# Patient Record
Sex: MALE | Race: WHITE | HISPANIC OR LATINO | URBAN - METROPOLITAN AREA
[De-identification: names, ages, dates, MRNs, and addresses within clinical notes are randomized per-mention and may not be internally consistent; named-entity substitution may affect disease eponyms.]

---

## 2020-06-07 ENCOUNTER — EMERGENCY (EMERGENCY)
Facility: HOSPITAL | Age: 25
LOS: 1 days | Discharge: OTHER INSTITUTION | End: 2020-06-07
Attending: STUDENT IN AN ORGANIZED HEALTH CARE EDUCATION/TRAINING PROGRAM
Payer: MEDICARE

## 2020-06-07 VITALS
HEIGHT: 68 IN | RESPIRATION RATE: 20 BRPM | OXYGEN SATURATION: 98 % | SYSTOLIC BLOOD PRESSURE: 165 MMHG | HEART RATE: 108 BPM | WEIGHT: 199.96 LBS | DIASTOLIC BLOOD PRESSURE: 89 MMHG | TEMPERATURE: 98 F

## 2020-06-07 DIAGNOSIS — F20.9 SCHIZOPHRENIA, UNSPECIFIED: ICD-10-CM

## 2020-06-07 DIAGNOSIS — F48.9 NONPSYCHOTIC MENTAL DISORDER, UNSPECIFIED: ICD-10-CM

## 2020-06-07 DIAGNOSIS — F33.3 MAJOR DEPRESSIVE DISORDER, RECURRENT, SEVERE WITH PSYCHOTIC SYMPTOMS: ICD-10-CM

## 2020-06-07 LAB
ALBUMIN SERPL ELPH-MCNC: 4.8 G/DL — SIGNIFICANT CHANGE UP (ref 3.3–5.2)
ALP SERPL-CCNC: 70 U/L — SIGNIFICANT CHANGE UP (ref 40–120)
ALT FLD-CCNC: 39 U/L — SIGNIFICANT CHANGE UP
AMPHET UR-MCNC: NEGATIVE — SIGNIFICANT CHANGE UP
ANION GAP SERPL CALC-SCNC: 16 MMOL/L — SIGNIFICANT CHANGE UP (ref 5–17)
APAP SERPL-MCNC: <7.5 UG/ML — LOW (ref 10–26)
APPEARANCE UR: ABNORMAL
AST SERPL-CCNC: 25 U/L — SIGNIFICANT CHANGE UP
BACTERIA # UR AUTO: ABNORMAL
BARBITURATES UR SCN-MCNC: NEGATIVE — SIGNIFICANT CHANGE UP
BASOPHILS # BLD AUTO: 0.06 K/UL — SIGNIFICANT CHANGE UP (ref 0–0.2)
BASOPHILS NFR BLD AUTO: 0.5 % — SIGNIFICANT CHANGE UP (ref 0–2)
BENZODIAZ UR-MCNC: POSITIVE
BILIRUB SERPL-MCNC: 0.3 MG/DL — LOW (ref 0.4–2)
BILIRUB UR-MCNC: NEGATIVE — SIGNIFICANT CHANGE UP
BUN SERPL-MCNC: 9 MG/DL — SIGNIFICANT CHANGE UP (ref 8–20)
CALCIUM SERPL-MCNC: 9.8 MG/DL — SIGNIFICANT CHANGE UP (ref 8.6–10.2)
CHLORIDE SERPL-SCNC: 102 MMOL/L — SIGNIFICANT CHANGE UP (ref 98–107)
CO2 SERPL-SCNC: 23 MMOL/L — SIGNIFICANT CHANGE UP (ref 22–29)
COCAINE METAB.OTHER UR-MCNC: NEGATIVE — SIGNIFICANT CHANGE UP
COLOR SPEC: YELLOW — SIGNIFICANT CHANGE UP
CREAT SERPL-MCNC: 1.01 MG/DL — SIGNIFICANT CHANGE UP (ref 0.5–1.3)
DIFF PNL FLD: ABNORMAL
EOSINOPHIL # BLD AUTO: 0.02 K/UL — SIGNIFICANT CHANGE UP (ref 0–0.5)
EOSINOPHIL NFR BLD AUTO: 0.2 % — SIGNIFICANT CHANGE UP (ref 0–6)
EPI CELLS # UR: SIGNIFICANT CHANGE UP
ETHANOL SERPL-MCNC: <10 MG/DL — SIGNIFICANT CHANGE UP
GLUCOSE SERPL-MCNC: 103 MG/DL — HIGH (ref 70–99)
GLUCOSE UR QL: NEGATIVE MG/DL — SIGNIFICANT CHANGE UP
HCT VFR BLD CALC: 45.3 % — SIGNIFICANT CHANGE UP (ref 39–50)
HGB BLD-MCNC: 15.3 G/DL — SIGNIFICANT CHANGE UP (ref 13–17)
IMM GRANULOCYTES NFR BLD AUTO: 0.2 % — SIGNIFICANT CHANGE UP (ref 0–1.5)
KETONES UR-MCNC: NEGATIVE — SIGNIFICANT CHANGE UP
LEUKOCYTE ESTERASE UR-ACNC: NEGATIVE — SIGNIFICANT CHANGE UP
LYMPHOCYTES # BLD AUTO: 37.9 % — SIGNIFICANT CHANGE UP (ref 13–44)
LYMPHOCYTES # BLD AUTO: 4.97 K/UL — HIGH (ref 1–3.3)
MCHC RBC-ENTMCNC: 29.4 PG — SIGNIFICANT CHANGE UP (ref 27–34)
MCHC RBC-ENTMCNC: 33.8 GM/DL — SIGNIFICANT CHANGE UP (ref 32–36)
MCV RBC AUTO: 86.9 FL — SIGNIFICANT CHANGE UP (ref 80–100)
METHADONE UR-MCNC: NEGATIVE — SIGNIFICANT CHANGE UP
MONOCYTES # BLD AUTO: 1.46 K/UL — HIGH (ref 0–0.9)
MONOCYTES NFR BLD AUTO: 11.1 % — SIGNIFICANT CHANGE UP (ref 2–14)
NEUTROPHILS # BLD AUTO: 6.58 K/UL — SIGNIFICANT CHANGE UP (ref 1.8–7.4)
NEUTROPHILS NFR BLD AUTO: 50.1 % — SIGNIFICANT CHANGE UP (ref 43–77)
NITRITE UR-MCNC: NEGATIVE — SIGNIFICANT CHANGE UP
OPIATES UR-MCNC: NEGATIVE — SIGNIFICANT CHANGE UP
PCP SPEC-MCNC: SIGNIFICANT CHANGE UP
PCP UR-MCNC: NEGATIVE — SIGNIFICANT CHANGE UP
PH UR: 6 — SIGNIFICANT CHANGE UP (ref 5–8)
PLATELET # BLD AUTO: 211 K/UL — SIGNIFICANT CHANGE UP (ref 150–400)
POTASSIUM SERPL-MCNC: 4 MMOL/L — SIGNIFICANT CHANGE UP (ref 3.5–5.3)
POTASSIUM SERPL-SCNC: 4 MMOL/L — SIGNIFICANT CHANGE UP (ref 3.5–5.3)
PROT SERPL-MCNC: 7.7 G/DL — SIGNIFICANT CHANGE UP (ref 6.6–8.7)
PROT UR-MCNC: 15 MG/DL
RBC # BLD: 5.21 M/UL — SIGNIFICANT CHANGE UP (ref 4.2–5.8)
RBC # FLD: 13 % — SIGNIFICANT CHANGE UP (ref 10.3–14.5)
RBC CASTS # UR COMP ASSIST: SIGNIFICANT CHANGE UP /HPF (ref 0–4)
SALICYLATES SERPL-MCNC: <0.6 MG/DL — LOW (ref 10–20)
SARS-COV-2 RNA SPEC QL NAA+PROBE: SIGNIFICANT CHANGE UP
SODIUM SERPL-SCNC: 141 MMOL/L — SIGNIFICANT CHANGE UP (ref 135–145)
SP GR SPEC: 1.02 — SIGNIFICANT CHANGE UP (ref 1.01–1.02)
THC UR QL: POSITIVE
UROBILINOGEN FLD QL: NEGATIVE MG/DL — SIGNIFICANT CHANGE UP
WBC # BLD: 13.11 K/UL — HIGH (ref 3.8–10.5)
WBC # FLD AUTO: 13.11 K/UL — HIGH (ref 3.8–10.5)
WBC UR QL: SIGNIFICANT CHANGE UP

## 2020-06-07 PROCEDURE — 99285 EMERGENCY DEPT VISIT HI MDM: CPT

## 2020-06-07 PROCEDURE — 90792 PSYCH DIAG EVAL W/MED SRVCS: CPT

## 2020-06-07 PROCEDURE — 99053 MED SERV 10PM-8AM 24 HR FAC: CPT

## 2020-06-07 RX ORDER — DIPHENHYDRAMINE HCL 50 MG
50 CAPSULE ORAL ONCE
Refills: 0 | Status: COMPLETED | OUTPATIENT
Start: 2020-06-07 | End: 2020-06-07

## 2020-06-07 RX ORDER — MIDAZOLAM HYDROCHLORIDE 1 MG/ML
4 INJECTION, SOLUTION INTRAMUSCULAR; INTRAVENOUS ONCE
Refills: 0 | Status: DISCONTINUED | OUTPATIENT
Start: 2020-06-07 | End: 2020-06-07

## 2020-06-07 RX ORDER — HALOPERIDOL DECANOATE 100 MG/ML
5 INJECTION INTRAMUSCULAR ONCE
Refills: 0 | Status: COMPLETED | OUTPATIENT
Start: 2020-06-07 | End: 2020-06-07

## 2020-06-07 RX ADMIN — Medication 4 MILLIGRAM(S): at 21:13

## 2020-06-07 RX ADMIN — HALOPERIDOL DECANOATE 5 MILLIGRAM(S): 100 INJECTION INTRAMUSCULAR at 21:13

## 2020-06-07 NOTE — ED BEHAVIORAL HEALTH ASSESSMENT NOTE - NS ED BHA DEMOGRAPHICS MEDICAL RECORD REVIEWED YES RECORDS
Hospital chart negative No S3, S4/Regular rate and variability/Normal S1, S2/Symmetric upper and lower extremity pulses of normal amplitude/No pericardial rub vibratory murmur of medium intensity appreciated

## 2020-06-07 NOTE — ED BEHAVIORAL HEALTH NOTE - BEHAVIORAL HEALTH NOTE
SWNote: pt became somewhat agitated ,pacing and pushing the doors trying to leave, code gray activated/pt medicated. GHADANote: pt became somewhat agitated ,pacing and pushing the doors trying to leave, code gray activated/pt sedated, will be taken to the main ED.

## 2020-06-07 NOTE — ED ADULT NURSE REASSESSMENT NOTE - NS ED NURSE REASSESS COMMENT FT1
Assumed care of patient at 0715.  Patient resting in bed asleep with no distress.  Safety of patient maintained.

## 2020-06-07 NOTE — ED ADULT TRIAGE NOTE - CHIEF COMPLAINT QUOTE
patient brought in by family states that he has been depressed for 1 month and suicidal, has no job, has no plan at this time but has tried before, patient nodding yes to every questioned asked, patient denies drug use but has taken some alcohol, PA called to bedside for assessment

## 2020-06-07 NOTE — ED ADULT NURSE NOTE - OBJECTIVE STATEMENT
24y Male arrives for depression. Pt rolled in on stretcher agitated and yelling, swinging all extremities, security and multiple RN's bedside. Pt medicated with IM versed for agitation and aggression per NIK Salgado. Pt placed in yellow gown, 1:1 and security remain bedside. Pt breathing even and unlabored, no obvious signs of trauma or injury.

## 2020-06-07 NOTE — ED PROVIDER NOTE - CLINICAL SUMMARY MEDICAL DECISION MAKING FREE TEXT BOX
23 yo male bib family for depression and si. pt admits to feeling depressed and wanting to talk to psychiatry

## 2020-06-07 NOTE — ED PROVIDER NOTE - PROGRESS NOTE DETAILS
pt actively tried to leave er after nodding yes to Si with plan. pt detained by security, fighting to leave. pt medicated in intake with 2 of ativan but continues to physical fight with security. 50 of benadryl given and another 2 of ativan. pt calms down and says that he is depressed and wants to talk to psychiatry. pt fighting with RN staff and security during blood work.   given 4mg iv versed called by psych RN pt screaming at staff punching at doors will require chemical sedation to treat acute agiation for pt and staff safety, transfereed abck to main after tx acute sedation for propr monitoring Patient signed out to me by Dr. Ugalde, pending psychiatric reevaluation in the morning. Shira Thibodeaux DO Patient to be involuntarily admitted to [facility], accepting MD Tompkins. As per signout from Dr. Thibodeaux, patient pending transfer to [facility].

## 2020-06-07 NOTE — ED ADULT NURSE REASSESSMENT NOTE - NS ED NURSE REASSESS COMMENT FT1
Patient not attempting to harm self or others.  Patient self isolating in his room, out to use bathroom.  Provided dinner tray.  Safety of patient maintained.

## 2020-06-07 NOTE — ED ADULT NURSE REASSESSMENT NOTE - DESCRIPTION
received pt in yellow gown waned by security for contraband.  pt yawning not answering questions.  shuck head yes no to a few questions.  denies drug etoh use. continues to yawn eyes half closed.  pt was sedated in ed remains drowsy escorted to room made comfortable

## 2020-06-07 NOTE — ED ADULT NURSE REASSESSMENT NOTE - NS ED NURSE REASSESS COMMENT FT1
Patient awoken for vital sign assessment.  Patient oriented to staff and educated about plan of care. Patient cooperative with assessment.  Patient endorsed feeling tired and declined breakfast when offered.  Remains resting in bed with no distress.  Safety of patient maintained.

## 2020-06-07 NOTE — ED PROVIDER NOTE - CARE PLAN
Principal Discharge DX:	Suicidal ideations Principal Discharge DX:	Suicidal ideations  Secondary Diagnosis:	Agitation requiring sedation protocol

## 2020-06-07 NOTE — ED BEHAVIORAL HEALTH NOTE - BEHAVIORAL HEALTH NOTE
Dianna : worker met with pt alert somewhat sleepy and forgetful. States  he has not been feeling well feels like he has a demon inside ,unable to sleep well feels his mind is constantly racing ,expresses poor appetite too. Worker informed pt ,per MD (Dr Ochoa)most likely he will need psych inpatient hospitalization. Pt unsure if he has medical insurance ,states he has [facility] enrollment however,does not remember any info. Pt informed worker he has a VA card ,most likely at home.  P/c to pt's mother (Candis Ochoa ) worker requested her to read all info on [facility] card :  [facility] enrollment member card # [id number]. Plan ID#[id number], active until 03/25/2029. Tel  . Per pt's mother no other medical insurance as far as she knows. Candis would like a call when pt gets transfer.   Va called all offices closed , VA liason Michelle available until Monday 8 am. Worker called [facility] for [facility] ,spoke with Jessika who states they are not taken any admissions at this moment due to the  Covid crisis. Encouraged worker to call Michelle in the am for information in how to proceed. Worker will leave pt on handoff to follow in the am.

## 2020-06-07 NOTE — ED PROVIDER NOTE - PHYSICAL EXAMINATION
nontoxic appearing male refusing to speak answering questions with nod of head. minimal eye contact. flat affect.   heart tachy no murmur   lungs cta   abd soft nd nttp

## 2020-06-07 NOTE — ED ADULT NURSE REASSESSMENT NOTE - NSIMPLEMENTINTERV_GEN_ALL_ED
Implemented All Fall Risk Interventions:  [place] to call system. Call bell, personal items and telephone within reach. Instruct patient to call for assistance. Room bathroom lighting operational. Non-slip footwear when patient is off stretcher. Physically safe environment: no spills, clutter or unnecessary equipment. Stretcher in lowest position, wheels locked, appropriate side rails in place. Provide visual cue, wrist band, yellow gown, etc. Monitor gait and stability. Monitor for mental status changes and reorient to person, place, and time. Review medications for side effects contributing to fall risk. Reinforce activity limits and safety measures with patient and family.

## 2020-06-07 NOTE — ED ADULT NURSE REASSESSMENT NOTE - NS ED NURSE REASSESS COMMENT FT1
patient aggitiated hitting walls and attempting to break through doors Dr. Ugalde called and isabella koenig called patient medicated as ordered

## 2020-06-07 NOTE — ED ADULT NURSE REASSESSMENT NOTE - NS ED NURSE REASSESS COMMENT FT1
Patient has been resting in bed, requested door closed when male peer repeatedly attempted to enter room.  Patient maintained behavioral control.  Patient awaiting consult.  Safety of patient maintained.

## 2020-06-07 NOTE — ED ADULT NURSE REASSESSMENT NOTE - NS ED NURSE REASSESS COMMENT FT1
Assumed care of patient. patient alert and cooperative. patient resting in bed. patient aware of need for approval from VA for further transferring plan.  patient offered po fluid and will monitor and chart change maintain safe environment

## 2020-06-07 NOTE — ED BEHAVIORAL HEALTH ASSESSMENT NOTE - SUMMARY
Patient a 25 y/o single  male, domiciled with his mother for a week, past Psychiatric hx of Depression, 2 prior Psychiatric Hospitalization, hx of SI but no SA, hx of THC use, no medical issues was BIB/family as he was not acting right.    Currently, depressed, anxious with A/H and passive SI and would benefit from in-patient hospitalization  for stability, safety and meds adjustment

## 2020-06-07 NOTE — ED ADULT NURSE REASSESSMENT NOTE - NS ED NURSE REASSESS COMMENT FT1
patient pacing in unit requesting to leave . n Informed patient that he would be staying and awaiting on tranfer to another facility pending on VA benefits.  patient requesting to speak with MD states that he is feeling better and wants to leave

## 2020-06-07 NOTE — ED ADULT NURSE REASSESSMENT NOTE - NS ED NURSE REASSESS COMMENT FT1
patient transferred back to main room  Verbal report given to Milena  patient transferred via wheelchair and security.

## 2020-06-08 VITALS
OXYGEN SATURATION: 99 % | RESPIRATION RATE: 18 BRPM | HEART RATE: 99 BPM | TEMPERATURE: 98 F | DIASTOLIC BLOOD PRESSURE: 85 MMHG | SYSTOLIC BLOOD PRESSURE: 139 MMHG

## 2020-06-08 PROCEDURE — T1013: CPT

## 2020-06-08 PROCEDURE — 81001 URINALYSIS AUTO W/SCOPE: CPT

## 2020-06-08 PROCEDURE — 99285 EMERGENCY DEPT VISIT HI MDM: CPT | Mod: 25

## 2020-06-08 PROCEDURE — 80307 DRUG TEST PRSMV CHEM ANLYZR: CPT

## 2020-06-08 PROCEDURE — 85027 COMPLETE CBC AUTOMATED: CPT

## 2020-06-08 PROCEDURE — 96372 THER/PROPH/DIAG INJ SC/IM: CPT

## 2020-06-08 PROCEDURE — U0003: CPT

## 2020-06-08 PROCEDURE — 36415 COLL VENOUS BLD VENIPUNCTURE: CPT

## 2020-06-08 PROCEDURE — 80053 COMPREHEN METABOLIC PANEL: CPT

## 2020-06-08 PROCEDURE — 99213 OFFICE O/P EST LOW 20 MIN: CPT

## 2020-06-08 RX ORDER — RISPERIDONE 4 MG/1
1 TABLET ORAL
Refills: 0 | Status: DISCONTINUED | OUTPATIENT
Start: 2020-06-08 | End: 2020-06-12

## 2020-06-08 RX ORDER — HYDROXYZINE HCL 10 MG
25 TABLET ORAL EVERY 8 HOURS
Refills: 0 | Status: DISCONTINUED | OUTPATIENT
Start: 2020-06-08 | End: 2020-06-12

## 2020-06-08 RX ADMIN — Medication 50 MILLIGRAM(S): at 02:00

## 2020-06-08 RX ADMIN — MIDAZOLAM HYDROCHLORIDE 4 MILLIGRAM(S): 1 INJECTION, SOLUTION INTRAMUSCULAR; INTRAVENOUS at 02:00

## 2020-06-08 RX ADMIN — Medication 25 MILLIGRAM(S): at 18:56

## 2020-06-08 RX ADMIN — RISPERIDONE 1 MILLIGRAM(S): 4 TABLET ORAL at 18:55

## 2020-06-08 RX ADMIN — Medication 4 MILLIGRAM(S): at 02:00

## 2020-06-08 NOTE — ED ADULT NURSE REASSESSMENT NOTE - NS ED NURSE REASSESS COMMENT FT1
pt appears anxious, constant movement of limbs, repeating same questions. one to one at bedside for safety, pt is alert and oriented times three, breathing room air, mildly tachycardic. awaiting eval by Psych this morning. Pt provided toileting and food and beverages pt appears comfortable. one to one at bedside for safety, pt is alert and oriented times three, breathing room air, awaiting eval by Psych this morning. Pt provided toileting and food and beverages

## 2020-06-08 NOTE — ED ADULT NURSE REASSESSMENT NOTE - STATUS
await on approval for transfer
awaiting consult
awaiting transfer, no change

## 2020-06-08 NOTE — CHART NOTE - NSCHARTNOTEFT_GEN_A_CORE
SW Note: SW arranged transport with [facility], RN provided with [facility] # to complete RN questions and finalize transport, Transfer packet left at bedside with 1:1, RN aware, no further SW services at this time

## 2020-06-08 NOTE — ED ADULT NURSE REASSESSMENT NOTE - NS ED NURSE REASSESS COMMENT FT1
0200 medications were administered by NIRMAL RN prior to pt coming from the unit to be observed in ED B4L.

## 2020-06-08 NOTE — CHART NOTE - NSCHARTNOTEFT_GEN_A_CORE
GHADA Note: Earlier followed up with the [facility] on a possible psych transfer. Spoke with Michelle EDMONDS [phone]. Due to the covid pandemic they are not accepting transfers. She provided ins info to help with finding placement. Called [facility]  and [facility] no available male beds at this time. Referral made to [facility]. Spoke with Sampson in admissions [phone]. pt has been accepted after clinical review. Accepting MD, Dr. Tompkins. 9.37 legals completed. Sampson spoke with Michelle EDMONDS from the [facility]. Michelle will work on ins auth after transfer. GHADA faxed to the VA appropriate clinicals to help with ins auth. [facility]. Notified pts father Rosetta Das  [phone] of transfer.

## 2020-06-08 NOTE — ED BEHAVIORAL HEALTH NOTE - BEHAVIORAL HEALTH NOTE
PROGRESS NOTE: 20 @ 11:43  	  • Reason for Ongoing Consultation: depressive and intrusive thoughts    ID: [id number] Male with HEALTH ISSUES - PROBLEM Dx:  Deferred condition on axis II  Schizophrenia, unspecified type  Severe episode of recurrent major depressive disorder, with psychotic features      INTERVAL DATA:   • Interval Chief Complaint: "I feel really sad"  • Interval History: Pt states that he feels really sad and depressed. He states that he feels worse since when he first came in, but does not know why. He repeatedly says "I just want my life back" and "I want control of my own mind". He reports that he has been hearing demons and seeing shadows and that they are controlling him, but he could not say what they were telling him to do. The patient is very overwhelmed by these thoughts and said "I wish I had killed myself". Pt initially did not think that he needed to be admitted to an inpatient psych unit, but then reluctantly admitted that he probably should be. Patient would often trail off when speaking and appeared as if he did not know what to say.    REVIEW OF SYSTEMS:   • Constitutional Symptoms	No complaints  • Eyes	No complaints  • Ears / Nose / Throat / Mouth	No complaints  • Cardiovascular	No complaints  • Respiratory	No complaints  • Gastrointestinal	No complaints  • Genitourinary	No complaints  • Musculoskeletal	No complaints  • Skin	No complaints  • Neurological	No complaints  • Psychiatric (see HPI)	See HPI  • Endocrine	No complaints  • Hematologic / Lymphatic	No complaints  • Allergic / Immunologic	No complaints    REVIEW OF VITALS/LABS/IMAGING/INVESTIGATIONS:   • Vital signs reviewed: Yes  • Vital Signs:	    T(C): 36.8 (20 @ 07:48), Max: 37.4 (20 @ 19:30)  HR: 69 (20 @ 07:48) (69 - 108)  BP: 117/75 (20 @ 07:48) (117/75 - 142/84)  RR: 18 (20 @ 07:48) (18 - 18)  SpO2: 97% (20 @ 07:48) (97% - 98%)    • Available labs reviewed: Yes  • Available Lab Results:                           15.3   13.11 )-----------( 211      ( 2020 02:43 )             45.3         141  |  102  |  9.0  ----------------------------<  103<H>  4.0   |  23.0  |  1.01    Ca    9.8      2020 02:43    TPro  7.7  /  Alb  4.8  /  TBili  0.3<L>  /  DBili  x   /  AST  25  /  ALT  39  /  AlkPhos  70  06-07    LIVER FUNCTIONS - ( 2020 02:43 )  Alb: 4.8 g/dL / Pro: 7.7 g/dL / ALK PHOS: 70 U/L / ALT: 39 U/L / AST: 25 U/L / GGT: x             Urinalysis Basic - ( 2020 14:01 )    Color: Yellow / Appearance: Slightly Turbid / S.025 / pH: x  Gluc: x / Ketone: Negative  / Bili: Negative / Urobili: Negative mg/dL   Blood: x / Protein: 15 mg/dL / Nitrite: Negative   Leuk Esterase: Negative / RBC: 0-2 /HPF / WBC 0-2   Sq Epi: x / Non Sq Epi: Occasional / Bacteria: Occasional      MEDICATIONS:      PRN Medications:  • PRN Medications since last evaluation	  • PRN Details	    Current Medications:   diphenhydrAMINE   Injectable 50 milliGRAM(s) IntraMuscular once     Medication Side Effects:  • Medication Side Effects or Adverse Reactions (new or ongoing)	None known    MENTAL STATUS EXAM:   • Level of Consciousness	Alert  • General Appearance	Well developed  • Body Habitus	overweight  • Hygiene	Good  • Grooming	Good  • Behavior	distracted  • Eye Contact	fair  • Relatedness	Good  • Impulse Control	Normal  • Muscle Tone / Strength	Normal muscle tone/strength  • Abnormal Movements	fidgeting and moving often  • Gait / Station	Normal gait / station  • Speech Volume	soft  • Speech Rate	slowed  • Speech Spontaneity increased latency  • Speech Articulation	Normal  • Mood	depressed  • Affect Quality	sad  • Affect Range	constricted  • Affect Congruence    Congruent  • Thought Process	Linear  • Thought Associations	Normal  • Thought Content	normal  • Perceptions         auditory and visual hallucinations  • Oriented to Time	Yes  • Oriented to Place	Yes  • Oriented to Situation	Yes  • Oriented to Person	Yes  • Attention / Concentration	distracted  • Estimated Intelligence	Average  • Recent Memory	Normal  • Remote Memory	Normal  • Fund of Knowledge	Normal  • Language	No abnormalities noted  • Judgment (regarding everyday events)  poor  • Insight (regarding psychiatric illness)	poor    SUICIDALITY:   • Suicidality (Interval)	yes    HOMICIDALITY/AGGRESSION:   • Homicidality/Aggression	none known    DIAGNOSIS DSM-V:    Psychiatric Diagnosis (Corresponds to DSM-IV Axis I, II): Schizophrenia, unspecified type   HEALTH ISSUES - PROBLEM Dx:  Deferred condition on axis II  Severe episode of recurrent major depressive disorder, with psychotic features       Medical Diagnosis (Corresponds to DSM-IV Axis III):  • Axis III	      ASSESSMENT OF CURRENT CONDITION:   Summary (include case differential, formulation and patient response to therapy): Pt is a 23 YO M w/ h/o schizophrenia p/w depressive symptoms and suicidal ideations. Pt admits feeling sad and to wishing that he would have killed himself. He also endorses auditory and visual hallucinations and frequently states "I just want my life back". Pt is willing to be admitted to an inpatient psych unit. Pt is not currently on any psychiatric medication.    Risk Assessment (consider static vs modifiable risk factors and protective factors; comment on level of risk for dangerous behavior): moderate suicidal risk    PLAN  Admission to inpatient psychiatric unit

## 2020-06-08 NOTE — ED ADULT NURSE REASSESSMENT NOTE - COMFORT CARE
plan of care explained/warm blanket provided/darkened lights/treatment delay explained/assisted to bedside commode/meal provided/po fluids offered/side rails up/wait time explained treatment delay explained/warm blanket provided/meal provided/po fluids offered/darkened lights/plan of care explained/side rails up/wait time explained

## 2020-07-23 ENCOUNTER — EMERGENCY (EMERGENCY)
Facility: HOSPITAL | Age: 25
LOS: 1 days | Discharge: OTHER INSTITUTION | End: 2020-07-23
Attending: EMERGENCY MEDICINE
Payer: MEDICARE

## 2020-07-23 VITALS
DIASTOLIC BLOOD PRESSURE: 81 MMHG | HEART RATE: 94 BPM | TEMPERATURE: 99 F | OXYGEN SATURATION: 97 % | RESPIRATION RATE: 18 BRPM | SYSTOLIC BLOOD PRESSURE: 118 MMHG

## 2020-07-23 VITALS — HEIGHT: 67 IN | WEIGHT: 229.94 LBS

## 2020-07-23 DIAGNOSIS — F12.10 CANNABIS ABUSE, UNCOMPLICATED: ICD-10-CM

## 2020-07-23 DIAGNOSIS — F25.8 OTHER SCHIZOAFFECTIVE DISORDERS: ICD-10-CM

## 2020-07-23 LAB
ALBUMIN SERPL ELPH-MCNC: 4.4 G/DL — SIGNIFICANT CHANGE UP (ref 3.3–5.2)
ALP SERPL-CCNC: 68 U/L — SIGNIFICANT CHANGE UP (ref 40–120)
ALT FLD-CCNC: 24 U/L — SIGNIFICANT CHANGE UP
AMPHET UR-MCNC: NEGATIVE — SIGNIFICANT CHANGE UP
ANION GAP SERPL CALC-SCNC: 14 MMOL/L — SIGNIFICANT CHANGE UP (ref 5–17)
APAP SERPL-MCNC: <7.5 UG/ML — LOW (ref 10–26)
AST SERPL-CCNC: 22 U/L — SIGNIFICANT CHANGE UP
BARBITURATES UR SCN-MCNC: NEGATIVE — SIGNIFICANT CHANGE UP
BASOPHILS # BLD AUTO: 0.06 K/UL — SIGNIFICANT CHANGE UP (ref 0–0.2)
BASOPHILS NFR BLD AUTO: 0.7 % — SIGNIFICANT CHANGE UP (ref 0–2)
BENZODIAZ UR-MCNC: NEGATIVE — SIGNIFICANT CHANGE UP
BILIRUB SERPL-MCNC: <0.2 MG/DL — LOW (ref 0.4–2)
BUN SERPL-MCNC: 12 MG/DL — SIGNIFICANT CHANGE UP (ref 8–20)
CALCIUM SERPL-MCNC: 9.6 MG/DL — SIGNIFICANT CHANGE UP (ref 8.6–10.2)
CHLORIDE SERPL-SCNC: 102 MMOL/L — SIGNIFICANT CHANGE UP (ref 98–107)
CO2 SERPL-SCNC: 23 MMOL/L — SIGNIFICANT CHANGE UP (ref 22–29)
COCAINE METAB.OTHER UR-MCNC: NEGATIVE — SIGNIFICANT CHANGE UP
CREAT SERPL-MCNC: 1.11 MG/DL — SIGNIFICANT CHANGE UP (ref 0.5–1.3)
EOSINOPHIL # BLD AUTO: 0.01 K/UL — SIGNIFICANT CHANGE UP (ref 0–0.5)
EOSINOPHIL NFR BLD AUTO: 0.1 % — SIGNIFICANT CHANGE UP (ref 0–6)
ETHANOL SERPL-MCNC: <10 MG/DL — SIGNIFICANT CHANGE UP
GLUCOSE SERPL-MCNC: 131 MG/DL — HIGH (ref 70–99)
HCT VFR BLD CALC: 46 % — SIGNIFICANT CHANGE UP (ref 39–50)
HGB BLD-MCNC: 15.7 G/DL — SIGNIFICANT CHANGE UP (ref 13–17)
IMM GRANULOCYTES NFR BLD AUTO: 0.2 % — SIGNIFICANT CHANGE UP (ref 0–1.5)
LYMPHOCYTES # BLD AUTO: 1.86 K/UL — SIGNIFICANT CHANGE UP (ref 1–3.3)
LYMPHOCYTES # BLD AUTO: 22.4 % — SIGNIFICANT CHANGE UP (ref 13–44)
MCHC RBC-ENTMCNC: 29.3 PG — SIGNIFICANT CHANGE UP (ref 27–34)
MCHC RBC-ENTMCNC: 34.1 GM/DL — SIGNIFICANT CHANGE UP (ref 32–36)
MCV RBC AUTO: 85.8 FL — SIGNIFICANT CHANGE UP (ref 80–100)
METHADONE UR-MCNC: NEGATIVE — SIGNIFICANT CHANGE UP
MONOCYTES # BLD AUTO: 0.54 K/UL — SIGNIFICANT CHANGE UP (ref 0–0.9)
MONOCYTES NFR BLD AUTO: 6.5 % — SIGNIFICANT CHANGE UP (ref 2–14)
NEUTROPHILS # BLD AUTO: 5.82 K/UL — SIGNIFICANT CHANGE UP (ref 1.8–7.4)
NEUTROPHILS NFR BLD AUTO: 70.1 % — SIGNIFICANT CHANGE UP (ref 43–77)
OPIATES UR-MCNC: NEGATIVE — SIGNIFICANT CHANGE UP
PCP SPEC-MCNC: SIGNIFICANT CHANGE UP
PCP UR-MCNC: NEGATIVE — SIGNIFICANT CHANGE UP
PLATELET # BLD AUTO: 173 K/UL — SIGNIFICANT CHANGE UP (ref 150–400)
POTASSIUM SERPL-MCNC: 4.3 MMOL/L — SIGNIFICANT CHANGE UP (ref 3.5–5.3)
POTASSIUM SERPL-SCNC: 4.3 MMOL/L — SIGNIFICANT CHANGE UP (ref 3.5–5.3)
PROT SERPL-MCNC: 7 G/DL — SIGNIFICANT CHANGE UP (ref 6.6–8.7)
RBC # BLD: 5.36 M/UL — SIGNIFICANT CHANGE UP (ref 4.2–5.8)
RBC # FLD: 12.8 % — SIGNIFICANT CHANGE UP (ref 10.3–14.5)
SALICYLATES SERPL-MCNC: <0.6 MG/DL — LOW (ref 10–20)
SARS-COV-2 RNA SPEC QL NAA+PROBE: SIGNIFICANT CHANGE UP
SODIUM SERPL-SCNC: 139 MMOL/L — SIGNIFICANT CHANGE UP (ref 135–145)
THC UR QL: POSITIVE
WBC # BLD: 8.31 K/UL — SIGNIFICANT CHANGE UP (ref 3.8–10.5)
WBC # FLD AUTO: 8.31 K/UL — SIGNIFICANT CHANGE UP (ref 3.8–10.5)

## 2020-07-23 PROCEDURE — 93010 ELECTROCARDIOGRAM REPORT: CPT | Mod: 76

## 2020-07-23 PROCEDURE — 85027 COMPLETE CBC AUTOMATED: CPT

## 2020-07-23 PROCEDURE — 93005 ELECTROCARDIOGRAM TRACING: CPT

## 2020-07-23 PROCEDURE — 80053 COMPREHEN METABOLIC PANEL: CPT

## 2020-07-23 PROCEDURE — 36415 COLL VENOUS BLD VENIPUNCTURE: CPT

## 2020-07-23 PROCEDURE — 99285 EMERGENCY DEPT VISIT HI MDM: CPT

## 2020-07-23 PROCEDURE — 87635 SARS-COV-2 COVID-19 AMP PRB: CPT

## 2020-07-23 PROCEDURE — 99053 MED SERV 10PM-8AM 24 HR FAC: CPT

## 2020-07-23 PROCEDURE — 80307 DRUG TEST PRSMV CHEM ANLYZR: CPT

## 2020-07-23 RX ORDER — RISPERIDONE 4 MG/1
0 TABLET ORAL
Qty: 0 | Refills: 0 | DISCHARGE

## 2020-07-23 RX ADMIN — Medication 2 MILLIGRAM(S): at 03:24

## 2020-07-23 NOTE — ED ADULT NURSE REASSESSMENT NOTE - NS ED NURSE REASSESS COMMENT FT1
attempted to evaluate pt unsuccessful due to pt being drowsy and unable to stay awake for interview.
pt informed of plan to transfer for in-patient tx, pt is calm and cooperative and endorses plan.
received to  unit at this time pt drowsy. received in yellow gown waned by security for contraband. covid swab obtained pt made comfortable resting at this time
assumed care off pt sleeping/resting in NAD. breakfast tray provided. pt reported to have not made any attempts to harm self or others. pt's safety being maintained.

## 2020-07-23 NOTE — CHART NOTE - NSCHARTNOTEFT_GEN_A_CORE
SW Note: Pt will be transferred to [facility] for inpt psychiatric tx. Accepting Dr. Turner Tinsley. legal status: 9.37. Pt aware. Pts mother Candis called nurses station, pt gave verbal permission and she was notified about the plan.   Confirmed that the [facility] in [place] are not accepting any transfers at this time due to covid -19, spoke with Morelia in admissions [phone].   Ambulance will be arranged.

## 2020-07-23 NOTE — ED ADULT NURSE NOTE - NSIMPLEMENTINTERV_GEN_ALL_ED
Implemented All Fall Risk Interventions:  [place] to call system. Call bell, personal items and telephone within reach. Instruct patient to call for assistance. Room bathroom lighting operational. Non-slip footwear when patient is off stretcher. Physically safe environment: no spills, clutter or unnecessary equipment. Stretcher in lowest position, wheels locked, appropriate side rails in place. Provide visual cue, wrist band, yellow gown, etc. Monitor gait and stability. Monitor for mental status changes and reorient to person, place, and time. Review medications for side effects contributing to fall risk. Reinforce activity limits and safety measures with patient and family.

## 2020-07-23 NOTE — ED BEHAVIORAL HEALTH ASSESSMENT NOTE - SUMMARY
24 year old male with history of anxiety and depression came in due to positive auditory hallucination. Pt reported past suicidal thoughts but no attempted, currently pt is denying suicidal ideation plan or intent but reports hearing the voice of the devil this morning telling him to hurt himself. Pt reported feeling safe here but is unable to contract for safety outside of the hospital. Pt denied pain and discomfort, reports poor sleep and decrease appetite. Current plan is to transfer patient into an inpatient hospital setting for  reevaluation of current diagnosis and possible medication adjustment.

## 2020-07-23 NOTE — ED ADULT TRIAGE NOTE - CHIEF COMPLAINT QUOTE
pt ambulatory into ED with family. pt a&ox3, pt attempting to walk out, as per family pt is suicidal and not on his medications x 1 week. pt redirected into room, remains calm and cooperative. admits to SI. denies HI. pt states he feels chest pressure and very anxious. admits to smoking marijuana tonight but denies alcohol use.

## 2020-07-23 NOTE — ED BEHAVIORAL HEALTH ASSESSMENT NOTE - DETAILS
pt served in the [employer] for 4 years and was honorably discharge a year and a half ago strange voice telling him to harm self (the devil) Pt reported admitted to [facility] inpatient 3 weeks ago Pt currently denying SI/SH, but reports positive auditory hallucinations telling him to hurt self NA

## 2020-07-23 NOTE — ED BEHAVIORAL HEALTH ASSESSMENT NOTE - DESCRIPTION
None 24 year male, single and unemployed, currently living with mother and recently applied for a job at [employer] ex Vital Signs Last 24 Hrs  T(C): 37.3 (23 Jul 2020 02:31), Max: 37.3 (23 Jul 2020 02:31)  T(F): 99.1 (23 Jul 2020 02:31), Max: 99.1 (23 Jul 2020 02:31)  HR: 97 (23 Jul 2020 04:54) (97 - 136)  BP: 133/76 (23 Jul 2020 04:54) (131/79 - 156/74)  RR: 18 (23 Jul 2020 04:54) (18 - 19)  SpO2: 98% (23 Jul 2020 04:54) (97% - 98%)

## 2020-07-23 NOTE — ED BEHAVIORAL HEALTH ASSESSMENT NOTE - OTHER PAST PSYCHIATRIC HISTORY (INCLUDE DETAILS REGARDING ONSET, COURSE OF ILLNESS, INPATIENT/OUTPATIENT TREATMENT)
Pt with history of depression and anxiety reported 2 prior inpatient hospitalization. Pt also reports history of insomnia, paranoia and positive hallucinations ongoing for a few years. pt reported hearing the voice of the devil this morning telling him to hurt himself  unclear if currently a [facility] Patient

## 2020-07-23 NOTE — ED BEHAVIORAL HEALTH ASSESSMENT NOTE - HPI (INCLUDE ILLNESS QUALITY, SEVERITY, DURATION, TIMING, CONTEXT, MODIFYING FACTORS, ASSOCIATED SIGNS AND SYMPTOMS)
24 year old male with history of depression came in due to positive auditory hallucinations with commands. Pt reported two prior hospitalizations, last one  three weeks ago at [facility] , pt denied any medical issues but reports years of depression, anxiety and paranoia. 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 Pt reports depressed mood, poor sleep and  decrease appetite. pt has history of THC and cigarettes use and reports drinking a can of beer daily. Pt reports having positive auditory hallucination of the devil telling him to hurt himself, pt currently denying suicidal ideation and self harm but reports past suicidal thoughts to blow his face off, pt denied past suicidal attempt. Pt reported feeling safe here in the hospital but was unable to contract for safety outside of the hospital.

## 2020-07-23 NOTE — ED BEHAVIORAL HEALTH ASSESSMENT NOTE - RISK ASSESSMENT
pt is unable to contract for safety outside of the hospital at this time, currently pt reports positive auditory hallucination with commands to hurt self. Unsure if pt is compliant with treatment and medication regiment at home Moderate Acute Suicide Risk

## 2020-07-23 NOTE — ED PROVIDER NOTE - OBJECTIVE STATEMENT
25 yo M presents to ED brought by family for evaluation of increased anxiety with reported SI with no obvious plan at this time.  Pt was admitted to [facility] in June and [facility] and doing well on meds including Seroquel and Risperdal, until his father stopped his meds while visiting him in [place] 1 week ago., Pt took dose of meds tontine, and smiled THC.  Pt difficult to obtain hx form at this time, but does admit to feeling anxious at this time

## 2020-07-23 NOTE — ED ADULT NURSE NOTE - NS ED BHA BENZODIAZEPINES
Per verbal order by Dr. Jaya Reynoso, read back for confirmation, called the pt and advised her of the dosing update on her Malarone. The pt voiced full understanding. The instructions are to begin taking the medication 2 days prior to her trip (12/24/) and take daily then entire trip and for 1 weeks after return. [address] and spoke with the pharmacist Marie Noguera) and called in the updated script. Homero De La Vega voiced thanks and understanding. None known

## 2020-07-23 NOTE — ED ADULT NURSE NOTE - HPI (INCLUDE ILLNESS QUALITY, SEVERITY, DURATION, TIMING, CONTEXT, MODIFYING FACTORS, ASSOCIATED SIGNS AND SYMPTOMS)
received pt in yellow gown waned by security for [employer]. pt is awake but drowsy.  was medicated states feeling tired.  as per pt he is feeling paranoid. stating I feel like I am going to die.  pt admits to hearing voices in his head stating saying bad things but wont elaborate.  pt is calm at this time and cooperative. he admits to drinking a beer a day stating he recently restarted.  denies h/i denies drug use admits to smoking marijuana.

## 2020-08-08 ENCOUNTER — EMERGENCY (EMERGENCY)
Facility: HOSPITAL | Age: 25
LOS: 1 days | Discharge: HOME | End: 2020-08-08
Attending: EMERGENCY MEDICINE
Payer: SELF-PAY

## 2020-08-08 VITALS
WEIGHT: 229.94 LBS | DIASTOLIC BLOOD PRESSURE: 96 MMHG | HEIGHT: 67 IN | OXYGEN SATURATION: 98 % | TEMPERATURE: 98 F | HEART RATE: 97 BPM | SYSTOLIC BLOOD PRESSURE: 148 MMHG | RESPIRATION RATE: 20 BRPM

## 2020-08-08 PROBLEM — F32.9 MAJOR DEPRESSIVE DISORDER, SINGLE EPISODE, UNSPECIFIED: Chronic | Status: ACTIVE | Noted: 2020-07-23

## 2020-08-08 PROBLEM — F43.10 POST-TRAUMATIC STRESS DISORDER, UNSPECIFIED: Chronic | Status: ACTIVE | Noted: 2020-07-23

## 2020-08-08 PROCEDURE — 99283 EMERGENCY DEPT VISIT LOW MDM: CPT

## 2020-08-08 RX ORDER — BENZTROPINE MESYLATE 1 MG
1 TABLET ORAL
Qty: 8 | Refills: 0
Start: 2020-08-08 | End: 2020-08-11

## 2020-08-08 RX ORDER — BENZTROPINE MESYLATE 1 MG
2 TABLET ORAL ONCE
Refills: 0 | Status: COMPLETED | OUTPATIENT
Start: 2020-08-08 | End: 2020-08-08

## 2020-08-08 RX ADMIN — Medication 2 MILLIGRAM(S): at 16:54

## 2020-08-08 NOTE — ED STATDOCS - OBJECTIVE STATEMENT
23 y/o M pt with significant PMHx of Depression presents to the ED c/o muscle rigidity onset yesterday. Associated with slow ROM. He reports that he feels like his arms are "locked up". Patient states that he just had his psych medication changed yesterday. Patient is ambulatory without difficulty in the ED. Denies fever, dysphagia, changes in speech, or head trauma. No further acute complaints at this time.

## 2020-08-08 NOTE — ED STATDOCS - PROGRESS NOTE DETAILS
Patient feeling significantly improved. Spoke with psych NP Ariel, recommends discharge on Benztropine 1 mg BID. Will write prescription for 3 days, as patient has appointment with psych in 3 days. Encouraged to f/u with psych as scheduled. Patient's cousin at bedside, agrees with plan. Will discharge home. Will discharge home, significantly improved.,

## 2020-08-08 NOTE — ED ADULT TRIAGE NOTE - CHIEF COMPLAINT QUOTE
Pt having difficulties moving all extremities due to stiffness.  Pt states this happens due to psych meds adverse reaction.

## 2020-08-08 NOTE — ED STATDOCS - PATIENT PORTAL LINK FT
You can access the FollowMyHealth Patient Portal offered by [facility] by registering at the following website: [email or web address]. By joining [facility]’s FollowMyHealth portal, you will also be able to view your health information using other applications (apps) compatible with our system.

## 2020-08-08 NOTE — ED STATDOCS - NS ED ATTENDING STATEMENT MOD
I have personally performed a face to face diagnostic evaluation on this patient. I have reviewed the ACP note and agree with the history, exam and plan of care, except as noted. negative

## 2020-08-08 NOTE — ED STATDOCS - NSFOLLOWUPINSTRUCTIONS_ED_ALL_ED_FT
* TAKE ALL MEDICATIONS as directed.   **Benztropine**    * IF NEEDED, CALL [phone] TO FIND A PRIMARY CARE PHYSICIAN.  OR CALL [phone] TO MAKE AN APPOINTMENT WITH THE MEDICAL CLINIC.    *  RETURN TO THE ER FOR ANY WORSENING SYMPTOMS.    * Follow-up with PSYCHIATRIST on Monday to discuss current medications.

## 2022-02-22 NOTE — ED ADULT NURSE NOTE - NS_NURSE_RECEIVING_PHYS_ED_ALL_ED_FT
Detail Level: Detailed Quality 111:Pneumonia Vaccination Status For Older Adults: Pneumococcal Vaccination Previously Received Quality 110: Preventive Care And Screening: Influenza Immunization: Influenza Immunization previously received during influenza season [place]

## 2022-12-16 NOTE — ED PROVIDER NOTE - ATTENDING CONTRIBUTION TO CARE
HR=85 bpm, ZAQV=366/94 mmhg, SpO2=96.0 %, Resp=20 B/min, EtCO2=37 mmHg, Apnea=2 Seconds, Pain=0, Leslie=10, Chavez=2 I, Reji Massey, have personally performed a face to face diagnostic evaluation on this patient. I have reviewed the ACP note and agree with the history, exam and plan of care, except as noted.    25 yo M hx of depression brought in by family for suicidal ideation. patient refused to talk at times but indorse suicidal thought. patient was agitated and aggressive, security at bedside, he was given total ativan 4mg and benadryl 50 mg IM followed by versed 4 mg IM. blood work unremarkable. patient will need psych consult.

## 2022-12-29 NOTE — ED BEHAVIORAL HEALTH ASSESSMENT NOTE - NS ED BHA PLAN REASON FOR INPATIENT LEVEL CARE2
Gabapentin Counseling: I discussed with the patient the risks of gabapentin including but not limited to dizziness, somnolence, fatigue and ataxia. Other

## 2023-05-03 NOTE — ED ADULT TRIAGE NOTE - RESPIRATORY RATE (BREATHS/MIN)
ED TO INPATIENT HANDOFF NOTE:      ADMISSION DIAGNOSIS:   Nausea and vomiting, unspecified vomiting type [R11.2]      VITALS:  Vitals:    05/03/23 1230 05/03/23 1330 05/03/23 1400   Pulse: 81 82 87   Resp: 20 18 20   SpO2: 100% 100% 99%   BP: (!) 142/66 (!) 144/68 (!) 145/73       Oxygen Needs  room air      Mental Status   Alert and oriented      Safety     None      Tele: Yes      RHYTHM: NSR      Tele Box:[id number]  Final Check completed with centralized telemetry unit prior to ED transfer.       Mobility    Independent      Isolation  None      Residence:   House      Additional Information: Pallative consult?      ER Nurse: Ravi Knott RN     Phone:[phone]   20

## 2025-08-11 ENCOUNTER — NON-APPOINTMENT (OUTPATIENT)
Age: 30
End: 2025-08-11

## 2025-08-11 ENCOUNTER — APPOINTMENT (OUTPATIENT)
Dept: OPHTHALMOLOGY | Facility: CLINIC | Age: 30
End: 2025-08-11
Payer: MEDICAID

## 2025-08-11 PROCEDURE — 92015 DETERMINE REFRACTIVE STATE: CPT | Mod: NC

## 2025-08-11 PROCEDURE — 92004 COMPRE OPH EXAM NEW PT 1/>: CPT
